# Patient Record
Sex: MALE | Employment: OTHER | URBAN - METROPOLITAN AREA
[De-identification: names, ages, dates, MRNs, and addresses within clinical notes are randomized per-mention and may not be internally consistent; named-entity substitution may affect disease eponyms.]

---

## 2018-10-04 LAB — CREATININE, EXTERNAL: 1.32

## 2018-10-06 LAB
CREATININE, EXTERNAL: 1.32
LDL-C, EXTERNAL: 28

## 2019-01-17 LAB — HBA1C MFR BLD HPLC: 9.4 %

## 2019-01-28 ENCOUNTER — OFFICE VISIT (OUTPATIENT)
Dept: ENDOCRINOLOGY | Age: 79
End: 2019-01-28

## 2019-01-28 VITALS
SYSTOLIC BLOOD PRESSURE: 130 MMHG | HEART RATE: 60 BPM | HEIGHT: 69 IN | DIASTOLIC BLOOD PRESSURE: 68 MMHG | WEIGHT: 200 LBS | BODY MASS INDEX: 29.62 KG/M2

## 2019-01-28 DIAGNOSIS — E11.42 TYPE 2 DIABETES MELLITUS WITH DIABETIC POLYNEUROPATHY, WITH LONG-TERM CURRENT USE OF INSULIN (HCC): Primary | ICD-10-CM

## 2019-01-28 DIAGNOSIS — Z79.4 TYPE 2 DIABETES MELLITUS WITH DIABETIC POLYNEUROPATHY, WITH LONG-TERM CURRENT USE OF INSULIN (HCC): Primary | ICD-10-CM

## 2019-01-28 LAB — HBA1C MFR BLD HPLC: 7.9 %

## 2019-01-28 RX ORDER — TRAMADOL HYDROCHLORIDE 50 MG/1
TABLET ORAL
Refills: 0 | COMMUNITY
Start: 2019-01-09

## 2019-01-28 RX ORDER — METFORMIN HYDROCHLORIDE 1000 MG/1
500 TABLET ORAL
COMMUNITY

## 2019-01-28 RX ORDER — LISINOPRIL 20 MG/1
20 TABLET ORAL
COMMUNITY
Start: 2018-12-03

## 2019-01-28 RX ORDER — INSULIN ASPART 100 [IU]/ML
20 INJECTION, SOLUTION INTRAVENOUS; SUBCUTANEOUS
COMMUNITY

## 2019-01-28 RX ORDER — HYDROCODONE BITARTRATE AND ACETAMINOPHEN 5; 325 MG/1; MG/1
TABLET ORAL
Refills: 0 | COMMUNITY
Start: 2019-01-17

## 2019-01-28 RX ORDER — GABAPENTIN 400 MG/1
400 CAPSULE ORAL
COMMUNITY

## 2019-01-28 RX ORDER — INSULIN DEGLUDEC 100 U/ML
100 INJECTION, SOLUTION SUBCUTANEOUS
COMMUNITY

## 2019-01-28 NOTE — PROGRESS NOTES
Presentation:   Silvino Pandya is a 77 y.o. male with Type 2 diabetes who comes for diabetes care and self-management training. Diagnosed in his mid 62s; started on metformin therapy. Is now on insulin therapy. Patient is poor historian and relies on his wife for information. A1c 7.9% (today). Past Medical History:   Diagnosis Date    Aneurysm ([address]     SMALL, RIGHT KIDNEY    Arrhythmia     PACEMAKER, BRADYCARDIA    Arthritis     Chronic obstructive pulmonary disease (HCC)     GERD (gastroesophageal reflux disease)     Hiatal hernia     High cholesterol     Hypertension     Other ill-defined conditions(799.89)     STENT RIGHT KIDNEY    Other ill-defined conditions(799.89)     BYPASS LEFT LEG VEIN (DR Kaylan Coburn)    Other ill-defined conditions(799.89)     RECOVERING ALCOHOLIC    Other ill-defined conditions(799.89)     PSORIASIS (FACE/NECK)    Psychiatric disorder     anxiety    Type II diabetes mellitus (HCC)      Social History     Socioeconomic History    Marital status:      Spouse name: Not on file    Number of children: Not on file    Years of education: Not on file    Highest education level: Not on file   Tobacco Use    Smoking status: Former Smoker     Packs/day: 1.00     Years: 58.00     Pack years: 58.00     Last attempt to quit: 2004     Years since quittin.1    Smokeless tobacco: Former User   Substance and Sexual Activity    Alcohol use: No    Drug use: No    Sexual activity: Yes     Partners: Female       Current Outpatient Medications   Medication Sig    gabapentin (NEURONTIN) 400 mg capsule Take 400 mg by mouth.  lisinopril (PRINIVIL, ZESTRIL) 20 mg tablet Take 20 mg by mouth.  metFORMIN (GLUCOPHAGE) 1,000 mg tablet Take 500 mg by mouth. Take 2 tabs twice a day with meals    traMADol (ULTRAM) 50 mg tablet take 1 tablet by mouth every 6 hours if needed for MODERATE PAIN (PAIN SCALE RATING 4-6)    rivaroxaban (XARELTO) 15 mg tab tablet Take 15 mg by mouth.  insulin degludec (TRESIBA FLEXTOUCH U-100) 100 unit/mL (3 mL) inpn 100 Units by SubCUTAneous route.  insulin aspart U-100 (NOVOLOG U-100 INSULIN ASPART) 100 unit/mL injection 20 Units by SubCUTAneous route.  HYDROcodone-acetaminophen (NORCO) 5-325 mg per tablet take 1 tablet by mouth four times a day if needed for pain    albuterol-ipratropium (DUO-NEB) 2.5 mg-0.5 mg/3 ml nebulizer solution 3 mL by Nebulization route every six (6) hours as needed for Wheezing.  citalopram (CELEXA) 40 mg tablet Take 40 mg by mouth daily.  rosuvastatin (CRESTOR) 20 mg tablet Take 20 mg by mouth daily.  tamsulosin (FLOMAX) 0.4 mg capsule 0.4 mg by Per G Tube route daily.  ezetimibe (ZETIA) 10 mg tablet Take 10 mg by mouth daily.  carvedilol (COREG) 6.25 mg tablet 6.25 mg by Per G Tube route two (2) times daily (with meals).  docusate (COLACE) 50 mg/5 mL liquid 100 mg by Per G Tube route two (2) times a day.  esomeprazole (NEXIUM) 40 mg capsule 40 mg by Per G Tube route daily.  tiotropium (SPIRIVA WITH HANDIHALER) 18 mcg inhalation capsule Take 1 Cap by inhalation daily.  enoxaparin (LOVENOX) 100 mg/mL 100 mg by SubCUTAneous route every twelve (12) hours.  OTHER by Per G Tube route three (3) times daily. Sodium Bicarbonate 1meq/ml abboject-use 30ml to dissolve pancrelipase    QUEtiapine (SEROQUEL) 25 mg tablet 25 mg by Per G Tube route nightly.  POLLEN EXTRACTS (PROSTAT PO) 30 mL by Per G Tube route daily. No current facility-administered medications for this visit. Medication compliance per Nicholas Hathaway LPN. Subjective:   Diabetes Self-care Practices  Healthy Eating - Consumes Moderate carbohydrate meals 2 times on most days. Variation in CHO load noted.  Twenty-four hour/usual dietary practices include:   (B)  11am Oatmeal with toast (honey too)   (D)  5pm [employer] with string beans and corn/beans (in smaller portions than previously)   (BT)  Nuts or PB toast with honey   (Jenny) No alcohol for 30 years  Being Active - Reports no regular exercise due to sciatica pain  Monitoring - He is testing blood glucoses using One Touch Ultra Mini meter system. Patient is testing blood sugars 2 times each day since last seen:   (B) 11am , although he may forget insulin or eat larger starch versions resulting in readings in the 200-300s   (D)  5pm 120-250s  Taking Medications - He is taking prescribed diabetes medications with more consistency. Objective:   Alert, oriented and in no acute distress     Visit Vitals  /68 (BP 1 Location: Right arm, BP Patient Position: Sitting)   Pulse 60   Ht 5' 9\" (1.753 m)   Wt 200 lb (90.7 kg)   BMI 29.53 kg/m²        Lab Results   Component Value Date    HBA1C 6.7 (H) 08/08/2012    HBA1C 5.7 01/27/2010    GFRAA >60 08/27/2015    GFRNA >60 08/27/2015    TSH 1.95 08/08/2012       Diabetes Self-care Practices  Problemsolving - Participated in constructing diabetes strategy using shared decision making. Does not use blood glucose data to make adjustments in self-care practices. Healthy Coping - He is not anxious or depressed. Feels stable. Reducing Risks - A1c is not in range. On ASA, BP medications and statin therapy per ADA recommendations.    Health Maintenance   Topic Date Due    DTaP/Tdap/Td series (1 - Tdap) 02/28/1960    Shingrix Vaccine Age 50> (1 of 2) 02/28/1989    GLAUCOMA SCREENING Q2Y  02/28/2004    Pneumococcal 65+ Low/Medium Risk (1 of 2 - PCV13) 02/28/2004    MEDICARE YEARLY EXAM  03/14/2018    Influenza Age 9 to Adult  08/01/2018      Barriers to diabetes self-care - Include Health/Illness and Educational    Nursing Care:     Nursing Diagnosis  99222 Ineffective Health Management   Stage of Change  Action   Nursing Intervention Domain 9180 Decision-making Support   Nursing Interventions Examined usual diabetes self-management practices related to meals, physical activity, BG monitoring and medication dosing  Explored strategies patient is willing to incorporate into their self-care plan  Informed of meaning of A1c and the recent improvement in that value with better attention to regular insulin administration and portion control at mealtime  Given handout on A1c     Evaluation:   Patient is ready, willing, and agrees to continue current course of good diabetes self-care. Plan:   1. Diabetes medication reconciliation per Kellee Wagner MD  2. Continue current diabetes regimen  3.  Track meals and blood sugars; call or send data as desired    Ta Angel DNP, RN, ACNS-BC, BC-ADM, CDE  Adult Health Clinical Nurse Specialist-Diabetes & Endocrine  Phone: [phone]  Fax:  [phone]

## 2019-01-28 NOTE — PROGRESS NOTES
Chief Complaint   Patient presents with    New Patient     PCP and Pharmacy verified    Diabetes     A1c due    Diabetic Foot Exam     due        HPI  This is a 69-year-old white male with a history of chronic alcoholism who has not had any alcohol for 30 years and a history of type 2 diabetes mellitus times 15 years with admissions to the hospital for hyperglycemia. He also has a history of pancreatitis and has had a prolonged admission to [facility] for 48 days for severe pancreatitis. He had an A1c in November 10.9%. He had been on metformin 1000 mg twice daily, Tresiba insulin 80 units, and NovoLog 20 units with his meals. Apparently he is adherence to this regimen was modest and he was admitted to the hospital about a month ago with marked hyperglycemia. He was transferred to a rehab facility and has been home for 2 weeks. He now presents for evaluation and management. Past medical history is notable for pancreatitis history of chronic back pain status post back surgery x3 with the most recent surgery in 2012. He is currently managed with gabapentin and tramadol. He has a history of alcohol abuse but has been abstinent for 30 years. Since being home from the hospital, his wife who is with him today has been very attentive to his insulin needs. He is also had home health coming into the house and that has also helped get him better in terms of his diabetes. His wife checks his blood sugar. The first blood sugar reading is usually first thing in the morning for which for him is 11:00. These blood sugar readings can be in a very acceptable range between 90 and 150. He has a few blood sugars that are elevated including this morning which was 238 after having [facility] for dinner last night. He also occasionally checks blood sugars at other times of the day but this is less frequent.     Breakfast is usually oatmeal or eggs and toast.  The dinner meal can be a salad or can be game and vegetables or meat starch and a vegetable. He tends to like toast with peanut butter and honey. His wife says that she is trying to avoid the potatoes and other starches and clearly this has helped. To that end, his A1c today is 7.8%. ROS  He denies chest pain, shortness of breath, constipation or diarrhea. Please note there is in some of the record and indication that he has been on pancrelipase but as far as we can tell he is not on this medication.   Family History   Problem Relation Age of Onset    Alcohol abuse Father         Social History     Socioeconomic History    Marital status:      Spouse name: Not on file    Number of children: Not on file    Years of education: Not on file    Highest education level: Not on file   Tobacco Use    Smoking status: Former Smoker     Packs/day: 1.00     Years: 58.00     Pack years: 58.00     Last attempt to quit: 2004     Years since quittin.1    Smokeless tobacco: Former User   Substance and Sexual Activity    Alcohol use: No    Drug use: No    Sexual activity: Yes     Partners: Female        Vitals:    19 1025   BP: 130/68   Pulse: 60   Weight: 200 lb (90.7 kg)   Height: 5' 9\" (1.753 m)   PainSc:   0 - No pain        Physical Examination: General appearance - alert, well appearing, and in no distress  Mental status - alert, oriented to person, place, and time  Neck - supple, no significant adenopathy, thyroid exam: thyroid is normal in size without nodules or tenderness  Chest - clear to auscultation, no wheezes, rales or rhonchi, symmetric air entry  Heart - normal rate, regular rhythm, normal S1, S2, no murmurs, rubs, clicks or gallops  Abdomen - soft, nontender, nondistended, no masses or organomegaly  Extremities - intact peripheral pulses, 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 The A1c today is 7.4% is certainly an improvement and for this gentleman near goal.  We did not change the regimen. We encouraged the adherence model that they have adopted and advised them that we are happy to help in any way that we can. They would like to send us blood sugar readings per periodically and we can make adjustments over the phone as needed. Otherwise follow-up will be with his primary care physician.

## 2022-03-19 PROBLEM — E11.42 TYPE 2 DIABETES MELLITUS WITH DIABETIC POLYNEUROPATHY, WITH LONG-TERM CURRENT USE OF INSULIN (HCC): Status: ACTIVE | Noted: 2019-01-28

## 2022-03-19 PROBLEM — Z79.4 TYPE 2 DIABETES MELLITUS WITH DIABETIC POLYNEUROPATHY, WITH LONG-TERM CURRENT USE OF INSULIN (HCC): Status: ACTIVE | Noted: 2019-01-28

## 2023-03-03 ENCOUNTER — TRANSCRIBE ORDER (OUTPATIENT)
Dept: SCHEDULING | Age: 83
End: 2023-03-03

## 2023-03-03 DIAGNOSIS — M51.36 DISCOGENIC LOW BACK PAIN: Primary | ICD-10-CM

## 2023-03-03 DIAGNOSIS — M54.50 LUMBAR PAIN: ICD-10-CM

## 2023-03-03 DIAGNOSIS — M51.36 DDD (DEGENERATIVE DISC DISEASE), LUMBAR: ICD-10-CM

## 2023-03-03 DIAGNOSIS — M96.1 POSTLAMINECTOMY SYNDROME OF LUMBOSACRAL REGION: ICD-10-CM

## 2025-06-26 ENCOUNTER — HOSPITAL ENCOUNTER (OUTPATIENT)
Facility: HOSPITAL | Age: 85
Discharge: NURSING, REHAB OR LONG-TERM CARE | End: 2025-06-26
Attending: INTERNAL MEDICINE | Admitting: INTERNAL MEDICINE
Payer: MEDICARE

## 2025-06-26 VITALS
OXYGEN SATURATION: 95 % | DIASTOLIC BLOOD PRESSURE: 88 MMHG | TEMPERATURE: 98.2 F | WEIGHT: 185 LBS | HEIGHT: 71 IN | HEART RATE: 70 BPM | SYSTOLIC BLOOD PRESSURE: 130 MMHG | RESPIRATION RATE: 16 BRPM | BODY MASS INDEX: 25.9 KG/M2

## 2025-06-26 DIAGNOSIS — I49.9 ABNORMAL HEART RHYTHM: ICD-10-CM

## 2025-06-26 PROBLEM — I44.30 HEART BLOCK ATRIOVENTRICULAR: Status: ACTIVE | Noted: 2025-06-26

## 2025-06-26 LAB
ANION GAP SERPL CALC-SCNC: 4 MMOL/L (ref 2–12)
BASOPHILS # BLD: 0.02 K/UL (ref 0–0.1)
BASOPHILS NFR BLD: 0.4 % (ref 0–1)
BUN SERPL-MCNC: 18 MG/DL (ref 6–20)
BUN/CREAT SERPL: 13 (ref 12–20)
CALCIUM SERPL-MCNC: 9 MG/DL (ref 8.5–10.1)
CHLORIDE SERPL-SCNC: 102 MMOL/L (ref 97–108)
CO2 SERPL-SCNC: 31 MMOL/L (ref 21–32)
CREAT SERPL-MCNC: 1.4 MG/DL (ref 0.7–1.3)
DIFFERENTIAL METHOD BLD: ABNORMAL
ECHO BSA: 2.05 M2
EOSINOPHIL # BLD: 0.08 K/UL (ref 0–0.4)
EOSINOPHIL NFR BLD: 1.7 % (ref 0–7)
ERYTHROCYTE [DISTWIDTH] IN BLOOD BY AUTOMATED COUNT: 16.2 % (ref 11.5–14.5)
GLUCOSE BLD STRIP.AUTO-MCNC: 148 MG/DL (ref 65–117)
GLUCOSE SERPL-MCNC: 153 MG/DL (ref 65–100)
HCT VFR BLD AUTO: 45.1 % (ref 36.6–50.3)
HGB BLD-MCNC: 14.2 G/DL (ref 12.1–17)
IMM GRANULOCYTES # BLD AUTO: 0.01 K/UL (ref 0–0.04)
IMM GRANULOCYTES NFR BLD AUTO: 0.2 % (ref 0–0.5)
LYMPHOCYTES # BLD: 1.67 K/UL (ref 0.8–3.5)
LYMPHOCYTES NFR BLD: 35.5 % (ref 12–49)
MCH RBC QN AUTO: 32.1 PG (ref 26–34)
MCHC RBC AUTO-ENTMCNC: 31.5 G/DL (ref 30–36.5)
MCV RBC AUTO: 101.8 FL (ref 80–99)
MONOCYTES # BLD: 0.46 K/UL (ref 0–1)
MONOCYTES NFR BLD: 9.8 % (ref 5–13)
NEUTS SEG # BLD: 2.46 K/UL (ref 1.8–8)
NEUTS SEG NFR BLD: 52.4 % (ref 32–75)
NRBC # BLD: 0 K/UL (ref 0–0.01)
NRBC BLD-RTO: 0 PER 100 WBC
PLATELET # BLD AUTO: 80 K/UL (ref 150–400)
PMV BLD AUTO: 11.8 FL (ref 8.9–12.9)
POTASSIUM SERPL-SCNC: 3.8 MMOL/L (ref 3.5–5.1)
RBC # BLD AUTO: 4.43 M/UL (ref 4.1–5.7)
RBC MORPH BLD: ABNORMAL
SERVICE CMNT-IMP: ABNORMAL
SODIUM SERPL-SCNC: 137 MMOL/L (ref 136–145)
WBC # BLD AUTO: 4.7 K/UL (ref 4.1–11.1)

## 2025-06-26 PROCEDURE — C1785 PMKR, DUAL, RATE-RESP: HCPCS | Performed by: INTERNAL MEDICINE

## 2025-06-26 PROCEDURE — 85025 COMPLETE CBC W/AUTO DIFF WBC: CPT

## 2025-06-26 PROCEDURE — C1889 IMPLANT/INSERT DEVICE, NOC: HCPCS | Performed by: INTERNAL MEDICINE

## 2025-06-26 PROCEDURE — 99152 MOD SED SAME PHYS/QHP 5/>YRS: CPT | Performed by: INTERNAL MEDICINE

## 2025-06-26 PROCEDURE — 2500000003 HC RX 250 WO HCPCS: Performed by: INTERNAL MEDICINE

## 2025-06-26 PROCEDURE — 2709999900 HC NON-CHARGEABLE SUPPLY: Performed by: INTERNAL MEDICINE

## 2025-06-26 PROCEDURE — 99153 MOD SED SAME PHYS/QHP EA: CPT | Performed by: INTERNAL MEDICINE

## 2025-06-26 PROCEDURE — 36415 COLL VENOUS BLD VENIPUNCTURE: CPT

## 2025-06-26 PROCEDURE — 6360000002 HC RX W HCPCS: Performed by: INTERNAL MEDICINE

## 2025-06-26 PROCEDURE — 2720000010 HC SURG SUPPLY STERILE: Performed by: INTERNAL MEDICINE

## 2025-06-26 PROCEDURE — 80048 BASIC METABOLIC PNL TOTAL CA: CPT

## 2025-06-26 PROCEDURE — 82962 GLUCOSE BLOOD TEST: CPT

## 2025-06-26 RX ORDER — CEPHALEXIN 500 MG/1
500 CAPSULE ORAL 3 TIMES DAILY
Qty: 15 CAPSULE | Refills: 0 | Status: SHIPPED | OUTPATIENT
Start: 2025-06-26 | End: 2025-07-01

## 2025-06-26 RX ORDER — LOPERAMIDE HYDROCHLORIDE 2 MG/1
2 CAPSULE ORAL 4 TIMES DAILY PRN
COMMUNITY

## 2025-06-26 RX ORDER — MEMANTINE HYDROCHLORIDE 10 MG/1
10 TABLET ORAL 2 TIMES DAILY
COMMUNITY

## 2025-06-26 RX ORDER — GABAPENTIN 400 MG/1
300 CAPSULE ORAL 3 TIMES DAILY
COMMUNITY

## 2025-06-26 RX ORDER — DUTASTERIDE 0.5 MG/1
0.5 CAPSULE, LIQUID FILLED ORAL DAILY
COMMUNITY

## 2025-06-26 RX ORDER — MIDODRINE HYDROCHLORIDE 5 MG/1
5 TABLET ORAL 3 TIMES DAILY
COMMUNITY

## 2025-06-26 RX ORDER — INSULIN GLARGINE 100 [IU]/ML
INJECTION, SOLUTION SUBCUTANEOUS NIGHTLY
COMMUNITY

## 2025-06-26 RX ORDER — DIVALPROEX SODIUM 125 MG/1
125 CAPSULE, COATED PELLETS ORAL 2 TIMES DAILY
COMMUNITY

## 2025-06-26 RX ORDER — CEFAZOLIN SODIUM 1 G/3ML
INJECTION, POWDER, FOR SOLUTION INTRAMUSCULAR; INTRAVENOUS PRN
Status: DISCONTINUED | OUTPATIENT
Start: 2025-06-26 | End: 2025-06-26 | Stop reason: HOSPADM

## 2025-06-26 RX ORDER — FENTANYL CITRATE 50 UG/ML
INJECTION, SOLUTION INTRAMUSCULAR; INTRAVENOUS PRN
Status: DISCONTINUED | OUTPATIENT
Start: 2025-06-26 | End: 2025-06-26 | Stop reason: HOSPADM

## 2025-06-26 RX ORDER — TRAZODONE HYDROCHLORIDE 100 MG/1
100 TABLET ORAL NIGHTLY
COMMUNITY

## 2025-06-26 NOTE — DISCHARGE INSTRUCTIONS
DISCHARGE INSTRUCTIONS FOR PATIENTS WITH PACEMAKERS      Resume all prior medications. Start back on Eliquis Saturday AM  Take Keflex 500 mg three times daily for 5 days     1. Remember to call for an appointment in 2-4 weeks [phone] to check healing and implant programming.  2. Medic Alert Bracelets are available from your pharmacist to wear at all times if you choose to wear one.  3. Carry your ID card for pacemaker with you at all times.  This card will be given to you in the hospital or mailed to you.  4. The pacemaker will bulge slightly under your skin.  The bulge will decrease in size over the next few weeks.  Please notify the doctor's office if you notice any of the following around your site:   A.  A bruise that does not go away.   B.  Soreness or yellow, green, or brown drainage from the site.   C.  Any swelling from the site.   D.  If you have a fever of 100 degrees or higher that lasts for a few days.    INCISION CARE       1.  Leave skin glue over your site until it starts to fall off, usually in a few weeks.  2.  You may shower after 3 days as long as your incision isn’t submerged or directly sprayed upon until well healed.  3.  For comfort, wear loose fitting clothing.  4.  Report any signs of infection, fever, pain, swelling, redness, oozing, or heat at site especially if these symptoms increase after the first 3 to 4 days.    ACTIVITY PRECAUTIONS     1. Avoid rough contact with the implant site.  2. No driving for 14 days.  3. Avoid lifting your arm over your head, carrying anything on the affected side, or lifting over 10 pounds for 30 days.  For the first 2 days only bend your arm at the elbow.  4. Any extreme activity such as golf, weight lifting or exercise biking should be restricted for 60 days.  5. Do not carry objects by holding them against your implant site.   6.  No shooting rifles or any type of gun with the affected shoulder permanently.    SPECIAL PRECAUTIONS     1.  You should

## 2025-06-26 NOTE — PROGRESS NOTES
Dual chamber pacemaker generator replacement completed s complications   [place]    Full report to follow

## 2025-06-26 NOTE — PROGRESS NOTES
Patient arrived to [facility] from EP Lab post generation change. Left chest site, CDI, no bleeding/hematoma. Ice pack applied. VSS, Vpaced, RA, Afebrile. Patient on bedrest. Per Dr. Navarro can Discharge in 2 hours at 5:15. RN called to arrange transport. Discharge instructions given to patient and daughter. Prescription for Keflex given to daughter with a 2 sets of discharge paperwork. Reviewed site care instructions and resuming Eliquis on Saturday.  1715: Patient ready for discharge, transportation here.

## 2025-06-26 NOTE — PROGRESS NOTES
Cardiac Cath Lab Recovery Arrival Note:      Rajeev Meyer arrived to Cardiac Cath Lab, Recovery Area. Staff introduced to patient. Patient identifiers verified with NAME and DATE OF BIRTH. Procedure verified with patient. Consent forms reviewed and signed by patient or authorized representative and verified. Allergies verified.     Patient and family oriented to department. Patient and family informed of procedure and plan of care.     Questions answered with review. Patient prepped for procedure, per orders from physician, prior to arrival.    Patient on cardiac monitor, non-invasive blood pressure, SPO2 monitor. On RA. Patient is A&Ox1, name. Patient reports NO PAIN.     Patient in stretcher, in low position, with side rails up, call bell within reach, patient instructed to call if assistance as needed.    Patient prep in: [facility], [place] [address].   Patient family has pager #   Family in: luis e, helped answer admission database   Prep by: CHRIS

## 2025-06-30 LAB — ECHO BSA: 2.05 M2
